# Patient Record
Sex: FEMALE | Race: WHITE
[De-identification: names, ages, dates, MRNs, and addresses within clinical notes are randomized per-mention and may not be internally consistent; named-entity substitution may affect disease eponyms.]

---

## 2019-08-17 ENCOUNTER — HOSPITAL ENCOUNTER (EMERGENCY)
Dept: HOSPITAL 76 - ED | Age: 39
LOS: 1 days | Discharge: HOME | End: 2019-08-18
Payer: MEDICAID

## 2019-08-17 DIAGNOSIS — I10: ICD-10-CM

## 2019-08-17 DIAGNOSIS — K08.89: Primary | ICD-10-CM

## 2019-08-17 PROCEDURE — 99282 EMERGENCY DEPT VISIT SF MDM: CPT

## 2019-08-17 PROCEDURE — 99283 EMERGENCY DEPT VISIT LOW MDM: CPT

## 2019-08-17 NOTE — ED PHYSICIAN DOCUMENTATION
PD HPI HEENT





- Stated complaint


Stated Complaint: DENTAL PAIN





- Chief complaint


Chief Complaint: Heent





- History obtained from


History obtained from: Patient





- History of Present Illness


Timing - onset: How many days ago (2)


Timing - duration: Days (2)


Timing - details: Gradual onset, Constant, Waxing and waning


Pain level now: 10


Location: Tooth


Improves: Nothing


Associated symptoms: No: Fever


Recently seen: Not recently seen





- Additional information


Additional information: 





patient says her tooth broke few months ago while eating. she says she felt as 

though the tooth was "just hanging there", so she pulled the tooth out herself. 

She says she did not see a dentist and does not have one. She presents due to 2 

days of increasing pain and swelling at the site of where the tooth had been. 

inadequate relief with ibuprofen. also took two tablets of "old" keflex 

belonging to a relative





Review of Systems


Constitutional: denies: Fever


Throat: reports: Dental pain / toothache





PD PAST MEDICAL HISTORY





- Past Medical History


Past Medical History: Yes


Cardiovascular: Hypertension


GI: GERD


Psych: ADD/ADHD





- Present Medications


Home Medications: 


                                Ambulatory Orders











 Medication  Instructions  Recorded  Confirmed


 


Clindamycin HCl [Clindamycin 300MG 300 mg PO Q6H #28 capsule 08/17/19 





CAP]   


 


Hydrocodone/Acetaminophen 1 - 2 each PO Q6H PRN #14 tablet 08/17/19 





[Hydrocodon-Acetaminophen 5-325]   














- Allergies


Allergies/Adverse Reactions: 


                                    Allergies











Allergy/AdvReac Type Severity Reaction Status Date / Time


 


No Known Drug Allergies Allergy   Verified 08/17/19 22:32














PD ED PE NORMAL





- Vitals


Vital signs reviewed: Yes





- General


General: Alert and oriented X 3, No acute distress, Well developed/nourished





- HEENT


HEENT: Moist mucous membranes, Other (no facial swelling)





- Neck


Neck: Supple, no meningeal sign





PD ED PE EXPANDED





- HEENT


HEENT: Dental TTP (overall good dentition. tooth is absent: right maxillary 2nd 

premolar. there does not appear to be any remnant of the tooth and the gum 

appears to have healed over the site where the tooth had been . there is no 

obvious swelling, there is moderate TTP)





Results





- Vitals


Vitals: 


                               Vital Signs - 24 hr











  08/17/19 08/18/19 08/18/19





  22:29 00:02 00:05


 


Temperature 36.7 C  


 


Heart Rate 90  76


 


Respiratory 18 17 18





Rate   


 


Blood Pressure 159/101 H  163/118 H


 


O2 Saturation 99  99








                                     Oxygen











O2 Source                      Room air

















PD MEDICAL DECISION MAKING





- ED course


Complexity details: considered differential, d/w patient





Departure





- Departure


Disposition: 01 Home, Self Care


Clinical Impression: 


 Pain, dental





Condition: Good


Instructions:  ED Tooth Pain


Prescriptions: 


Clindamycin HCl [Clindamycin 300MG CAP] 300 mg PO Q6H #28 capsule


Hydrocodone/Acetaminophen [Hydrocodon-Acetaminophen 5-325] 1 - 2 each PO Q6H PRN

#14 tablet


 PRN Reason: pain


Comments: 


Follow up with dentist within 3-5 days


Discharge Date/Time: 08/18/19 00:10

## 2019-08-18 VITALS — SYSTOLIC BLOOD PRESSURE: 163 MMHG | DIASTOLIC BLOOD PRESSURE: 118 MMHG
